# Patient Record
Sex: MALE | URBAN - METROPOLITAN AREA
[De-identification: names, ages, dates, MRNs, and addresses within clinical notes are randomized per-mention and may not be internally consistent; named-entity substitution may affect disease eponyms.]

---

## 2021-07-12 ENCOUNTER — OFFICE VISIT (OUTPATIENT)
Dept: URBAN - METROPOLITAN AREA CLINIC 48 | Facility: CLINIC | Age: 61
End: 2021-07-12
Payer: COMMERCIAL

## 2021-07-12 DIAGNOSIS — H25.813 COMBINED FORMS OF AGE-RELATED CATARACT, BILATERAL: Primary | ICD-10-CM

## 2021-07-12 PROCEDURE — 99204 OFFICE O/P NEW MOD 45 MIN: CPT | Performed by: OPTOMETRIST

## 2021-07-12 ASSESSMENT — INTRAOCULAR PRESSURE
OS: 12
OD: 12

## 2021-07-12 NOTE — IMPRESSION/PLAN
Impression: Combined forms of age-related cataract, bilateral: H25.813. Plan:  Cataracts appear to be dense enough to account for patient's vision. However, with decreased view into the fundus cannot determine if there is any retinal pathology with a hazy view. B scan: retina appears flat 360 OU Recommend Cataract Evaluation with Dr. Katina Avendano

## 2021-07-19 ENCOUNTER — OFFICE VISIT (OUTPATIENT)
Dept: URBAN - METROPOLITAN AREA CLINIC 48 | Facility: CLINIC | Age: 61
End: 2021-07-19
Payer: COMMERCIAL

## 2021-07-19 DIAGNOSIS — H25.811 COMBINED FORMS OF AGE-RELATED CATARACT, RIGHT EYE: Primary | ICD-10-CM

## 2021-07-19 PROCEDURE — 92134 CPTRZ OPH DX IMG PST SGM RTA: CPT | Performed by: STUDENT IN AN ORGANIZED HEALTH CARE EDUCATION/TRAINING PROGRAM

## 2021-07-19 PROCEDURE — 92004 COMPRE OPH EXAM NEW PT 1/>: CPT | Performed by: STUDENT IN AN ORGANIZED HEALTH CARE EDUCATION/TRAINING PROGRAM

## 2021-07-19 ASSESSMENT — KERATOMETRY
OS: 42.88
OD: 42.63

## 2021-07-19 ASSESSMENT — INTRAOCULAR PRESSURE
OD: 8
OS: 9

## 2021-07-19 NOTE — IMPRESSION/PLAN
Impression: Combined forms of age-related cataract, left eye: H25.812. Plan: Visually significant, poor view though OCT shows IRF OU can schedule OD will also schedule with Dr. Lillian Collazo for [facility]  further evaluation and treatment prior to proceeding with scheduling CE IOL OS.

## 2021-07-19 NOTE — IMPRESSION/PLAN
Impression: Combined forms of age-related cataract, right eye: H25.811. Plan: The patient has a visually significant cataract in the right eye. After discussion with the patient and careful examination it has been determined that a cataract in the right eye is accounting for a significant amount of patient's visual symptoms. Cataract surgery and the associated risks, benefits, alternatives, expectations, and recovery were discussed in detail with the patient. All questions were answered. The patient understands that there may be some limitation in visual potential given any pre-existing ocular disease. Advised patient we will be using: Dexycu Schedule Cataract Surgery right then. RL 2
good dilation, no previous LASIK surgery, no alpha blockers Discussed lens options with patient, patient candidate for standard Lens, pt. knows retinal abnormalities may limit vision after sx.

## 2021-07-20 ENCOUNTER — TESTING ONLY (OUTPATIENT)
Dept: URBAN - METROPOLITAN AREA CLINIC 48 | Facility: CLINIC | Age: 61
End: 2021-07-20
Payer: COMMERCIAL

## 2021-07-20 PROCEDURE — 76519 ECHO EXAM OF EYE: CPT | Performed by: STUDENT IN AN ORGANIZED HEALTH CARE EDUCATION/TRAINING PROGRAM

## 2021-07-20 ASSESSMENT — PACHYMETRY
OD: 22.73
OD: 2.62

## 2021-07-20 ASSESSMENT — KERATOMETRY
OS: 43.52
OD: 43.13

## 2021-07-27 ENCOUNTER — SURGERY (OUTPATIENT)
Dept: URBAN - METROPOLITAN AREA SURGERY 26 | Facility: SURGERY | Age: 61
End: 2021-07-27
Payer: COMMERCIAL

## 2021-07-27 PROCEDURE — 66984 XCAPSL CTRC RMVL W/O ECP: CPT | Performed by: STUDENT IN AN ORGANIZED HEALTH CARE EDUCATION/TRAINING PROGRAM

## 2021-07-28 ENCOUNTER — POST-OPERATIVE VISIT (OUTPATIENT)
Dept: URBAN - METROPOLITAN AREA CLINIC 48 | Facility: CLINIC | Age: 61
End: 2021-07-28
Payer: COMMERCIAL

## 2021-07-28 PROCEDURE — 99024 POSTOP FOLLOW-UP VISIT: CPT | Performed by: OPTOMETRIST

## 2021-07-28 RX ORDER — SODIUM CHLORIDE 50 MG/ML
5 % SOLUTION OPHTHALMIC
Qty: 10 | Refills: 2 | Status: ACTIVE
Start: 2021-07-28

## 2021-07-28 ASSESSMENT — INTRAOCULAR PRESSURE: OD: 15

## 2021-07-28 NOTE — IMPRESSION/PLAN
Impression: S/P Cataract Extraction by phacoemulsification with IOL placement OD - 1 Day. Encounter for surgical aftercare following surgery on a sense organ  Z48.810. Plan: Dexycu given during Surgery. Start: Adalgisa 128 QID OD Restrictions discussed, No Bending over, no lifting 20 pounds or more, no tap water in eye, wear eye shield for 1 week while sleeping. RD precautions. Call the office should any flashes, floaters, or vision changes occur. 

RTO 1 week PO2

## 2021-08-03 ENCOUNTER — POST-OPERATIVE VISIT (OUTPATIENT)
Dept: URBAN - METROPOLITAN AREA CLINIC 48 | Facility: CLINIC | Age: 61
End: 2021-08-03
Payer: COMMERCIAL

## 2021-08-03 DIAGNOSIS — Z48.810 ENCOUNTER FOR SURGICAL AFTERCARE FOLLOWING SURGERY ON A SENSE ORGAN: Primary | ICD-10-CM

## 2021-08-03 PROCEDURE — 99024 POSTOP FOLLOW-UP VISIT: CPT | Performed by: STUDENT IN AN ORGANIZED HEALTH CARE EDUCATION/TRAINING PROGRAM

## 2021-08-03 ASSESSMENT — INTRAOCULAR PRESSURE
OD: 13
OS: 12

## 2021-08-03 NOTE — IMPRESSION/PLAN
Impression: S/P Cataract Extraction by phacoemulsification with IOL placement OD - 7 Days. Encounter for surgical aftercare following surgery on a sense organ  Z48.810. Plan: - Eye is doing well
- d/c Adalgisa 128 
- RT/RD precautions reviewed with the patient
- Can resume normal activities with the exception of avoiding swimming for 1 more week Keep next appt.  as scheduled with Dr. Amarilys Chavez

## 2021-08-10 ENCOUNTER — POST-OPERATIVE VISIT (OUTPATIENT)
Dept: URBAN - METROPOLITAN AREA CLINIC 48 | Facility: CLINIC | Age: 61
End: 2021-08-10
Payer: COMMERCIAL

## 2021-08-10 PROCEDURE — 99024 POSTOP FOLLOW-UP VISIT: CPT | Performed by: OPHTHALMOLOGY

## 2021-08-10 ASSESSMENT — INTRAOCULAR PRESSURE
OD: 12
OS: 11

## 2021-08-10 NOTE — IMPRESSION/PLAN
Impression: S/P Cataract Extraction by phacoemulsification with IOL placement OD - 14 Days. Encounter for surgical aftercare following surgery on a sense organ  Z48.810. Plan: OD seems to be doing well RTC 2wk CAT/OCT Mac OS only - to be done as inez not PO

## 2021-09-01 ENCOUNTER — OFFICE VISIT (OUTPATIENT)
Dept: URBAN - METROPOLITAN AREA CLINIC 48 | Facility: CLINIC | Age: 61
End: 2021-09-01
Payer: COMMERCIAL

## 2021-09-01 DIAGNOSIS — H35.81 RETINAL EDEMA: ICD-10-CM

## 2021-09-01 DIAGNOSIS — H34.8310 BRANCH RETINAL VEIN OCCLUSION W/ MACULAR EDEMA, RIGHT EYE: Primary | ICD-10-CM

## 2021-09-01 DIAGNOSIS — H25.812 COMBINED FORMS OF AGE-RELATED CATARACT, LEFT EYE: ICD-10-CM

## 2021-09-01 PROCEDURE — 76512 OPH US DX B-SCAN: CPT | Performed by: OPHTHALMOLOGY

## 2021-09-01 PROCEDURE — 92014 COMPRE OPH EXAM EST PT 1/>: CPT | Performed by: OPHTHALMOLOGY

## 2021-09-01 PROCEDURE — 92134 CPTRZ OPH DX IMG PST SGM RTA: CPT | Performed by: OPHTHALMOLOGY

## 2021-09-01 ASSESSMENT — INTRAOCULAR PRESSURE
OS: 11
OD: 11

## 2021-09-01 NOTE — IMPRESSION/PLAN
Impression: Branch retinal vein occlusion w/ macular edema, right eye: K72.2755. OLD Plan: Old BRVO OD w/ ME, now that CE/IOL has been done there is better view of fundus. Recommend Avastin x1 OD To be done: Tomorrow 09/02 PLEASE GET AUTH
RTC 3wk DE/OCT

## 2021-09-01 NOTE — IMPRESSION/PLAN
Impression: Combined forms of age-related cataract, left eye: H25.812. Plan: The patient has a visually significant cataract in the left eye. After discussion with the patient and careful examination it has been determined that a cataract in the left eye is accounting for a significant amount of the patient's visual symptoms. Cataract surgery and the associated risks, benefits, alternatives, expectations, and recovery were discussed in detail with the patient. All questions were answered. The patient understands that there may be some limitation in visual potential given any pre-existing ocular disease. Discussed option of eye drops with patient, patient elects Dexycu injection   , discussed possible side effects of drops. The patient desires cataract surgery in the left eye. The patient desires cataract surgery in the left eye. Will need preop Atropine BID OS to be done 5 days prior to surgery. Schedule cataract surgery in the left eye. RL 2 Surgeon: Dr. Larry Lyons

## 2021-09-01 NOTE — IMPRESSION/PLAN
Impression: Retinal edema: H35.81 Left. Plan: Macular Edema noted on OCT Mac
recommend Avastin inj x1 To be done: Tomorrow 09/02 PLEASE GET Taryn Yang
will also do Bscan to check for poss RD. 
if RD is normal, proceed with CE/IOL in OS

## 2021-09-02 ENCOUNTER — PROCEDURE (OUTPATIENT)
Dept: URBAN - METROPOLITAN AREA CLINIC 48 | Facility: CLINIC | Age: 61
End: 2021-09-02
Payer: COMMERCIAL

## 2021-09-22 ENCOUNTER — OFFICE VISIT (OUTPATIENT)
Dept: URBAN - METROPOLITAN AREA CLINIC 48 | Facility: CLINIC | Age: 61
End: 2021-09-22
Payer: COMMERCIAL

## 2021-09-22 DIAGNOSIS — E11.3312 TYPE 2 DIAB W MODERATE NONPRLF DIAB RTNOP W MACULAR EDEMA, LEFT EYE: ICD-10-CM

## 2021-09-22 PROCEDURE — 92134 CPTRZ OPH DX IMG PST SGM RTA: CPT | Performed by: OPHTHALMOLOGY

## 2021-09-22 PROCEDURE — 92014 COMPRE OPH EXAM EST PT 1/>: CPT | Performed by: OPHTHALMOLOGY

## 2021-09-22 ASSESSMENT — INTRAOCULAR PRESSURE
OD: 11
OS: 9

## 2021-09-22 NOTE — IMPRESSION/PLAN
Impression: Type 2 diab w moderate nonprlf diab rtnop w macular edema, left eye: M66.9961. OCT MAC Findings: 
increased ME OD
decrease ME OS Plan: Recommend Avastin inj. 09/30/21 with 2 wk. post inj.  IOP check

see plan 1

## 2021-09-22 NOTE — IMPRESSION/PLAN
Impression: Branch retinal vein occlusion w/ macular edema, right eye: O98.3039. OCT MAC Findings: 
increased ME OD
decrease ME OS Plan: Recommend 1/2 dose Triecense OD on 09/30/21. (please obtain auth. if necessary) 
-R/B/A reviewed. Start Llevro qd od until gone (sample provided for pt. today) w/2 wk. post injection IOP check, YAG CAP OD, CAT EVAL OS (long exam)

## 2021-10-05 ENCOUNTER — SURGERY (OUTPATIENT)
Dept: URBAN - METROPOLITAN AREA SURGERY 26 | Facility: SURGERY | Age: 61
End: 2021-10-05
Payer: COMMERCIAL

## 2021-10-05 PROCEDURE — 66982 XCAPSL CTRC RMVL CPLX WO ECP: CPT | Performed by: STUDENT IN AN ORGANIZED HEALTH CARE EDUCATION/TRAINING PROGRAM

## 2021-10-06 ENCOUNTER — POST-OPERATIVE VISIT (OUTPATIENT)
Dept: URBAN - METROPOLITAN AREA CLINIC 48 | Facility: CLINIC | Age: 61
End: 2021-10-06
Payer: COMMERCIAL

## 2021-10-06 DIAGNOSIS — Z96.1 PRESENCE OF INTRAOCULAR LENS: Primary | ICD-10-CM

## 2021-10-06 PROCEDURE — 99024 POSTOP FOLLOW-UP VISIT: CPT | Performed by: OPTOMETRIST

## 2021-10-06 ASSESSMENT — INTRAOCULAR PRESSURE: OS: 16

## 2021-10-06 NOTE — IMPRESSION/PLAN
Impression: S/P Cataract Extraction by phacoemulsification with IOL placement OS - 1 Day. Presence of intraocular lens  Z96.1. Excellent post op course Plan: Dexycu given during Surgery. No post operative drops are needed. Restrictions discussed, No Bending over, no lifting 20 pounds or more, no tap water in eye, wear eye shield for 1 week while sleeping. RD precautions. Call the office should any flashes, floaters, or vision changes occur. 

RTO 1 week PO2

## 2021-10-12 ENCOUNTER — POST-OPERATIVE VISIT (OUTPATIENT)
Dept: URBAN - METROPOLITAN AREA CLINIC 48 | Facility: CLINIC | Age: 61
End: 2021-10-12
Payer: COMMERCIAL

## 2021-10-12 PROCEDURE — 99024 POSTOP FOLLOW-UP VISIT: CPT | Performed by: STUDENT IN AN ORGANIZED HEALTH CARE EDUCATION/TRAINING PROGRAM

## 2021-10-12 ASSESSMENT — INTRAOCULAR PRESSURE
OS: 11
OD: 11

## 2021-10-12 NOTE — IMPRESSION/PLAN
Impression: S/P Cataract Extraction by phacoemulsification with IOL placement OS - 7 Days. Presence of intraocular lens  Z96.1. OCT MAC Findings: OD SRF, OS Retinal atrophy Plan: - Eye is doing well - At this time pt. may obtain OTC Reading glasses - Recommend pt. to hold off on obtaining new prescription glasses until SRF has improved OD.
- RT/RD precautions reviewed with the patient
- Can resume normal activities with the exception of avoiding swimming for 1 more week RTC 1-2 wks.  with Dr. Rufus Carrillo for [facility]  with MAC OCT (long exam)

## 2021-10-26 ENCOUNTER — OFFICE VISIT (OUTPATIENT)
Dept: URBAN - METROPOLITAN AREA CLINIC 48 | Facility: CLINIC | Age: 61
End: 2021-10-26
Payer: COMMERCIAL

## 2021-10-26 DIAGNOSIS — H26.491 OTHER SECONDARY CATARACT, RIGHT EYE: ICD-10-CM

## 2021-10-26 PROCEDURE — 92014 COMPRE OPH EXAM EST PT 1/>: CPT | Performed by: OPHTHALMOLOGY

## 2021-10-26 ASSESSMENT — INTRAOCULAR PRESSURE
OD: 14
OS: 14

## 2021-10-26 NOTE — IMPRESSION/PLAN
Impression: Branch retinal vein occlusion w/ macular edema, right eye: V83.9136. OCT MAC Findings: 
increased ME OD
decrease ME OS Plan: Recommend Avastin OD x 2, discussed with patient Schedule Avastin OD X 2 every 4 weeks then  3 weeks DE/OCT Radha Carlin #1 0-4 days , please get auth

## 2021-10-26 NOTE — IMPRESSION/PLAN
Impression: Other secondary cataract, right eye: H26.491. Plan: Risks, benefits, alternatives, and expectations of YAG capsulotomy were discussed. The patient desires a YAG capsulotomy in the right eye.   Schedule YAG capsulotomy in the right eye, RL 2
1 day PO1 then 1 week PO 2

## 2021-10-28 ENCOUNTER — PROCEDURE (OUTPATIENT)
Dept: URBAN - METROPOLITAN AREA CLINIC 48 | Facility: CLINIC | Age: 61
End: 2021-10-28
Payer: COMMERCIAL

## 2021-10-28 PROCEDURE — 67028 INJECTION EYE DRUG: CPT | Performed by: OPHTHALMOLOGY

## 2021-11-04 ENCOUNTER — SURGERY (OUTPATIENT)
Dept: URBAN - METROPOLITAN AREA SURGERY 26 | Facility: SURGERY | Age: 61
End: 2021-11-04
Payer: COMMERCIAL

## 2021-11-04 PROCEDURE — 66821 AFTER CATARACT LASER SURGERY: CPT | Performed by: OPHTHALMOLOGY

## 2021-11-30 ENCOUNTER — PROCEDURE (OUTPATIENT)
Dept: URBAN - METROPOLITAN AREA CLINIC 48 | Facility: CLINIC | Age: 61
End: 2021-11-30
Payer: COMMERCIAL

## 2021-11-30 PROCEDURE — 67028 INJECTION EYE DRUG: CPT | Performed by: OPHTHALMOLOGY

## 2022-01-04 ENCOUNTER — OFFICE VISIT (OUTPATIENT)
Dept: URBAN - METROPOLITAN AREA CLINIC 48 | Facility: CLINIC | Age: 62
End: 2022-01-04
Payer: COMMERCIAL

## 2022-01-04 PROCEDURE — 92134 CPTRZ OPH DX IMG PST SGM RTA: CPT | Performed by: OPHTHALMOLOGY

## 2022-01-04 PROCEDURE — 92014 COMPRE OPH EXAM EST PT 1/>: CPT | Performed by: OPHTHALMOLOGY

## 2022-01-04 ASSESSMENT — INTRAOCULAR PRESSURE
OD: 12
OS: 11

## 2022-01-04 NOTE — IMPRESSION/PLAN
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 May require vitrectomy in the future for AH . Plan: Patient has poor glucose control. Recommend Injections x 3 OU. Patient to continue good glucose control and blood pressure control. Schedule Avastin OU x 3  #1 0-3 days , then every 4 weeks on Injection Friday's, please get Classie Closs 
at #2 injection please get OCT mac with progression analysis

## 2022-01-14 ENCOUNTER — PROCEDURE (OUTPATIENT)
Dept: URBAN - METROPOLITAN AREA CLINIC 48 | Facility: CLINIC | Age: 62
End: 2022-01-14
Payer: COMMERCIAL

## 2022-02-11 ENCOUNTER — PROCEDURE (OUTPATIENT)
Dept: URBAN - METROPOLITAN AREA CLINIC 48 | Facility: CLINIC | Age: 62
End: 2022-02-11
Payer: COMMERCIAL

## 2022-03-11 ENCOUNTER — PROCEDURE (OUTPATIENT)
Dept: URBAN - METROPOLITAN AREA CLINIC 48 | Facility: CLINIC | Age: 62
End: 2022-03-11
Payer: COMMERCIAL

## 2022-03-11 DIAGNOSIS — E11.3313 TYPE 2 DIABETES MELLITUS WITH MODERATE NONPROLIFERATIVE DIABETIC RETINOPATHY WITH MACULAR EDEMA, BILATERAL: Primary | ICD-10-CM

## 2022-04-13 ENCOUNTER — OFFICE VISIT (OUTPATIENT)
Dept: URBAN - METROPOLITAN AREA CLINIC 48 | Facility: CLINIC | Age: 62
End: 2022-04-13
Payer: COMMERCIAL

## 2022-04-13 DIAGNOSIS — E11.3313 TYPE 2 DIAB W MODERATE NONPRLF DIAB RTNOP W MACULAR EDEMA, BILATERAL: Primary | ICD-10-CM

## 2022-04-13 PROCEDURE — 92014 COMPRE OPH EXAM EST PT 1/>: CPT | Performed by: OPHTHALMOLOGY

## 2022-04-13 PROCEDURE — 92134 CPTRZ OPH DX IMG PST SGM RTA: CPT | Performed by: OPHTHALMOLOGY

## 2022-04-13 NOTE — IMPRESSION/PLAN
Impression: Type 2 diab w moderate nonprlf diab rtnop w macular edema, bilateral: R76.3675. OCT MAC findings: 
decrease ME OD 
decrease ME OS Plan: Patient has poor glucose control. Recommend Injections x 3 OU. Patient to continue good glucose control and blood pressure control. Schedule Avastin OD  x 2 
 #1 0- 10 days , then every 4 weeks on Injection Friday's, please get AUTH if required
at #2  injection. RTC 3-4 wk. post last inj. 
(please get OCT mac with progression analysis) RTRC 3-4 wks. post last injection. LTM (Longer term plan): After right eye ME better controlled will consider refraction and MARIELY on left eye. Patient may have macula ischemia. Could possibly benefit from IVFA and maybe vitrecomy of floaters OS with retina surgeon. Will consider in future exams.

## 2022-04-22 ENCOUNTER — PROCEDURE (OUTPATIENT)
Dept: URBAN - METROPOLITAN AREA CLINIC 48 | Facility: CLINIC | Age: 62
End: 2022-04-22
Payer: COMMERCIAL

## 2022-04-22 DIAGNOSIS — E11.3313 TYPE 2 DIABETES MELLITUS WITH MODERATE NONPROLIFERATIVE DIABETIC RETINOPATHY WITH MACULAR EDEMA, BILATERAL: Primary | ICD-10-CM

## 2022-04-22 PROCEDURE — 67028 INJECTION EYE DRUG: CPT | Performed by: OPHTHALMOLOGY

## 2022-05-20 ENCOUNTER — PROCEDURE (OUTPATIENT)
Dept: URBAN - METROPOLITAN AREA CLINIC 48 | Facility: CLINIC | Age: 62
End: 2022-05-20
Payer: COMMERCIAL

## 2022-05-20 DIAGNOSIS — E11.3313 TYPE 2 DIABETES MELLITUS WITH MODERATE NONPROLIFERATIVE DIABETIC RETINOPATHY WITH MACULAR EDEMA, BILATERAL: Primary | ICD-10-CM

## 2022-05-20 PROCEDURE — 67028 INJECTION EYE DRUG: CPT | Performed by: OPHTHALMOLOGY

## 2022-09-27 ENCOUNTER — OFFICE VISIT (OUTPATIENT)
Dept: URBAN - METROPOLITAN AREA CLINIC 48 | Facility: CLINIC | Age: 62
End: 2022-09-27
Payer: COMMERCIAL

## 2022-09-27 DIAGNOSIS — E11.3313 TYPE 2 DIABETES MELLITUS WITH MODERATE NONPROLIFERATIVE DIABETIC RETINOPATHY WITH MACULAR EDEMA, BILATERAL: Primary | ICD-10-CM

## 2022-09-27 DIAGNOSIS — H26.492 OTHER SECONDARY CATARACT, LEFT EYE: ICD-10-CM

## 2022-09-27 PROCEDURE — 92134 CPTRZ OPH DX IMG PST SGM RTA: CPT | Performed by: OPHTHALMOLOGY

## 2022-09-27 PROCEDURE — 92014 COMPRE OPH EXAM EST PT 1/>: CPT | Performed by: OPHTHALMOLOGY

## 2022-09-27 NOTE — IMPRESSION/PLAN
Impression: Other secondary cataract, left eye: H26.492. Plan: Risks, benefits, alternatives, and expectations of YAG capsulotomy were discussed. The patient desires a YAG capsulotomy in the left eye.  


Schedule YAG capsulotomy in the left eye RL 2
 with same or next day IOP check then 1 week PO. RL 2

## 2022-09-27 NOTE — IMPRESSION/PLAN
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 Plan: ME far from fovea OD, stable OS. Will take a break from injections at this time. if ME continues, may need to do additional injections and/or laser Continue to practice good sugar control. 

RTC 6wk DE/OCT Mac

## 2022-10-20 ENCOUNTER — POST-OPERATIVE VISIT (OUTPATIENT)
Dept: URBAN - METROPOLITAN AREA CLINIC 48 | Facility: CLINIC | Age: 62
End: 2022-10-20
Payer: COMMERCIAL

## 2022-10-20 DIAGNOSIS — Z48.810 ENCOUNTER FOR SURGICAL AFTERCARE FOLLOWING SURGERY ON A SENSE ORGAN: Primary | ICD-10-CM

## 2022-10-20 PROCEDURE — 92134 CPTRZ OPH DX IMG PST SGM RTA: CPT | Performed by: OPHTHALMOLOGY

## 2022-10-20 PROCEDURE — 99024 POSTOP FOLLOW-UP VISIT: CPT | Performed by: OPHTHALMOLOGY

## 2022-10-20 ASSESSMENT — INTRAOCULAR PRESSURE
OS: 14
OD: 15

## 2022-10-20 NOTE — IMPRESSION/PLAN
Impression: S/P YAG Capsulotomy (Yttrium Aluminum Idalou) OS - 7 Days. Encounter for surgical aftercare following surgery on a sense organ  Z48.810.  Plan: VA improved since YAG

## 2022-10-20 NOTE — IMPRESSION/PLAN
Impression: Type 2 diab w moderate nonprlf diab rtnop w macular edema, bilateral: O67.3402. Plan: Increased ME OU. Recommend Avastin injection OU Please schedule: Avastin OU x1 To be done: Tomorrow Friday PLEASE GET AUTH
w/ 3wk DE/OCT Mac

## 2022-10-21 ENCOUNTER — PROCEDURE (OUTPATIENT)
Dept: URBAN - METROPOLITAN AREA CLINIC 48 | Facility: CLINIC | Age: 62
End: 2022-10-21
Payer: COMMERCIAL

## 2022-10-21 DIAGNOSIS — E11.3313 TYPE 2 DIABETES MELLITUS WITH MODERATE NONPROLIFERATIVE DIABETIC RETINOPATHY WITH MACULAR EDEMA, BILATERAL: Primary | ICD-10-CM

## 2022-11-15 ENCOUNTER — OFFICE VISIT (OUTPATIENT)
Dept: URBAN - METROPOLITAN AREA CLINIC 48 | Facility: CLINIC | Age: 62
End: 2022-11-15
Payer: COMMERCIAL

## 2022-11-15 DIAGNOSIS — E11.3313 TYPE 2 DIABETES MELLITUS WITH MODERATE NONPROLIFERATIVE DIABETIC RETINOPATHY WITH MACULAR EDEMA, BILATERAL: Primary | ICD-10-CM

## 2022-11-15 PROCEDURE — 92014 COMPRE OPH EXAM EST PT 1/>: CPT | Performed by: OPHTHALMOLOGY

## 2022-11-15 PROCEDURE — 92134 CPTRZ OPH DX IMG PST SGM RTA: CPT | Performed by: OPHTHALMOLOGY

## 2022-11-15 ASSESSMENT — INTRAOCULAR PRESSURE
OD: 14
OS: 14

## 2022-11-15 NOTE — IMPRESSION/PLAN
Impression: Type 2 diabetes mellitus with moderate nonproliferative diabetic retinopathy with macular edema, bilateral: I71.9166. Plan: Recommend 1 more injection to OD as there is good response. Has spot in OD where we can do laser to help reduce injections in long run. MAP OD - circunate exudate around a MA. R/B/A Discussed with patient. Please schedule: MAP OD RL 2 
w/o Block 
in laser suite, please have MAC from 11/15/22 available for review RTC 10wk DE/OCT Mac

## 2022-12-27 ENCOUNTER — SURGERY (OUTPATIENT)
Dept: URBAN - METROPOLITAN AREA SURGERY 26 | Facility: SURGERY | Age: 62
End: 2022-12-27
Payer: COMMERCIAL

## 2022-12-27 PROCEDURE — 67210 TREATMENT OF RETINAL LESION: CPT | Performed by: OPHTHALMOLOGY

## 2023-01-24 ENCOUNTER — POST-OPERATIVE VISIT (OUTPATIENT)
Dept: URBAN - METROPOLITAN AREA CLINIC 48 | Facility: CLINIC | Age: 63
End: 2023-01-24
Payer: COMMERCIAL

## 2023-01-24 DIAGNOSIS — E11.3511 TYPE 2 DIABETES MELLITUS W/ PROLIFERATIVE DIABETIC RETINOPATHY W/ MACULAR EDEMA, RIGHT EYE: Primary | ICD-10-CM

## 2023-01-24 DIAGNOSIS — E11.3592 TYPE 2 DIABETES MELLITUS W/ PROLIFERATIVE DIABETIC RETINOPATHY W/O MACULAR EDEMA, LEFT EYE: ICD-10-CM

## 2023-01-24 DIAGNOSIS — H43.392 OTHER VITREOUS OPACITIES, LEFT EYE: ICD-10-CM

## 2023-01-24 PROCEDURE — 99024 POSTOP FOLLOW-UP VISIT: CPT | Performed by: OPHTHALMOLOGY

## 2023-01-24 ASSESSMENT — INTRAOCULAR PRESSURE
OS: 13
OD: 12

## 2023-01-24 NOTE — IMPRESSION/PLAN
Impression: Type 2 diabetes mellitus w/ proliferative diabetic retinopathy w/o macular edema, left eye: Z80.5232. Plan: non urgent consultation with Dr. Gonzales Estimable  in 2 months  
then general consultation with Dr. Kody Saenz

## 2023-01-24 NOTE — IMPRESSION/PLAN
Impression: Type 2 diabetes mellitus w/ proliferative diabetic retinopathy w/ macular edema, right eye: Y55.2068. Plan: Stable, no need for injections at this time. Will continue to monitor closely.

## 2023-01-24 NOTE — IMPRESSION/PLAN
Impression: Other vitreous opacities, left eye: H43.392. Plan: AH, do not suspect for OS vision 
would benefit from vitrectomy Dr. Jake Noriega next visit.

## 2023-05-03 ENCOUNTER — OFFICE VISIT (OUTPATIENT)
Facility: LOCATION | Age: 63
End: 2023-05-03
Payer: COMMERCIAL

## 2023-05-03 DIAGNOSIS — Z96.1 PRESENCE OF INTRAOCULAR LENS: ICD-10-CM

## 2023-05-03 DIAGNOSIS — E11.3392 TYPE 2 DIAB WITH MOD NONP RTNOP WITHOUT MACULAR EDEMA, L EYE: ICD-10-CM

## 2023-05-03 DIAGNOSIS — H43.392 OTHER VITREOUS OPACITIES, LEFT EYE: ICD-10-CM

## 2023-05-03 DIAGNOSIS — E11.3511 TYPE 2 DIABETES MELLITUS WITH PROLIFERATIVE DIABETIC RETINOPATHY WITH MACULAR EDEMA, RIGHT EYE: ICD-10-CM

## 2023-05-03 DIAGNOSIS — E11.3311 TYPE 2 DIAB WITH MOD NONP RTNOP WITH MACULAR EDEMA, R EYE: Primary | ICD-10-CM

## 2023-05-03 PROCEDURE — 92134 CPTRZ OPH DX IMG PST SGM RTA: CPT | Performed by: OPHTHALMOLOGY

## 2023-05-03 PROCEDURE — 92004 COMPRE OPH EXAM NEW PT 1/>: CPT | Performed by: OPHTHALMOLOGY

## 2023-05-03 ASSESSMENT — INTRAOCULAR PRESSURE
OS: 14
OD: 13

## 2023-05-03 NOTE — IMPRESSION/PLAN
Impression: Other vitreous opacities, left eye: H43.392. 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 The benign nature of this condition was discussed with the patient as this does not typically affect vision. We will continue to monitor for now.

## 2023-05-03 NOTE — IMPRESSION/PLAN
Impression: Type 2 diab with mod nonp rtnop with macular edema, r eye: B05.1654.
-history of focal laser OCT: 5/3/23 OD: temporal extrafoveal DME (increased from 6 months ago) OS: macular atrophy Plan: The diagnosis and prognosis of diabetic macular edema, as well as the risks and benefits of various treatment options including focal/grid laser, steroids, Lucentis, Eylea and Avastin; along with the alternatives of observation or participation in a clinical trial, were discussed at length. The patient understands that treatment may not improve vision, but should reduce the risk of further visual loss. Given stability of vision and exam, pt elects to proceed with observation.  

RTC 3 months DFE/OCT OU re-eeval

## 2023-08-07 ENCOUNTER — OFFICE VISIT (OUTPATIENT)
Facility: LOCATION | Age: 63
End: 2023-08-07
Payer: COMMERCIAL

## 2023-08-07 DIAGNOSIS — E11.3392 TYPE 2 DIAB WITH MOD NONP RTNOP WITHOUT MACULAR EDEMA, L EYE: ICD-10-CM

## 2023-08-07 DIAGNOSIS — E11.3311 TYPE 2 DIAB WITH MOD NONP RTNOP WITH MACULAR EDEMA, R EYE: Primary | ICD-10-CM

## 2023-08-07 DIAGNOSIS — H43.392 OTHER VITREOUS OPACITIES, LEFT EYE: ICD-10-CM

## 2023-08-07 PROCEDURE — 92014 COMPRE OPH EXAM EST PT 1/>: CPT | Performed by: OPHTHALMOLOGY

## 2023-08-07 PROCEDURE — 67028 INJECTION EYE DRUG: CPT | Performed by: OPHTHALMOLOGY

## 2023-08-07 PROCEDURE — 92134 CPTRZ OPH DX IMG PST SGM RTA: CPT | Performed by: OPHTHALMOLOGY

## 2023-08-07 ASSESSMENT — INTRAOCULAR PRESSURE
OD: 7
OS: 10

## 2023-09-20 ENCOUNTER — OFFICE VISIT (OUTPATIENT)
Facility: LOCATION | Age: 63
End: 2023-09-20
Payer: COMMERCIAL

## 2023-09-20 DIAGNOSIS — E11.3311 TYPE 2 DIABETES MELLITUS WITH MODERATE NONPROLIFERATIVE DIABETIC RETINOPATHY WITH MACULAR EDEMA, RIGHT EYE: Primary | ICD-10-CM

## 2023-09-20 PROCEDURE — 92134 CPTRZ OPH DX IMG PST SGM RTA: CPT | Performed by: OPHTHALMOLOGY

## 2023-09-20 PROCEDURE — 67028 INJECTION EYE DRUG: CPT | Performed by: OPHTHALMOLOGY

## 2023-09-20 ASSESSMENT — INTRAOCULAR PRESSURE
OS: 8
OD: 11

## 2023-10-18 ENCOUNTER — OFFICE VISIT (OUTPATIENT)
Facility: LOCATION | Age: 63
End: 2023-10-18
Payer: COMMERCIAL

## 2023-10-18 DIAGNOSIS — H43.392 OTHER VITREOUS OPACITIES, LEFT EYE: ICD-10-CM

## 2023-10-18 DIAGNOSIS — E11.3392 TYPE 2 DIAB WITH MOD NONP RTNOP WITHOUT MACULAR EDEMA, L EYE: ICD-10-CM

## 2023-10-18 DIAGNOSIS — E11.3311 TYPE 2 DIABETES MELLITUS WITH MODERATE NONPROLIFERATIVE DIABETIC RETINOPATHY WITH MACULAR EDEMA, RIGHT EYE: Primary | ICD-10-CM

## 2023-10-18 PROCEDURE — 92134 CPTRZ OPH DX IMG PST SGM RTA: CPT | Performed by: OPHTHALMOLOGY

## 2023-10-18 PROCEDURE — 92014 COMPRE OPH EXAM EST PT 1/>: CPT | Performed by: OPHTHALMOLOGY

## 2023-10-18 ASSESSMENT — INTRAOCULAR PRESSURE
OS: 7
OD: 8

## 2023-10-30 ENCOUNTER — OFFICE VISIT (OUTPATIENT)
Facility: LOCATION | Age: 63
End: 2023-10-30
Payer: COMMERCIAL

## 2023-10-30 DIAGNOSIS — E11.3311 TYPE 2 DIABETES MELLITUS WITH MODERATE NONPROLIFERATIVE DIABETIC RETINOPATHY WITH MACULAR EDEMA, RIGHT EYE: Primary | ICD-10-CM

## 2023-10-30 PROCEDURE — 67028 INJECTION EYE DRUG: CPT | Performed by: OPHTHALMOLOGY

## 2023-10-30 PROCEDURE — 92134 CPTRZ OPH DX IMG PST SGM RTA: CPT | Performed by: OPHTHALMOLOGY

## 2023-10-30 ASSESSMENT — INTRAOCULAR PRESSURE
OD: 12
OS: 15

## 2023-11-27 PROCEDURE — 92134 CPTRZ OPH DX IMG PST SGM RTA: CPT | Performed by: OPHTHALMOLOGY

## 2023-11-27 PROCEDURE — 67028 INJECTION EYE DRUG: CPT | Performed by: OPHTHALMOLOGY

## 2023-11-27 ASSESSMENT — INTRAOCULAR PRESSURE
OS: 10
OD: 13

## 2024-02-07 ENCOUNTER — OFFICE VISIT (OUTPATIENT)
Facility: LOCATION | Age: 64
End: 2024-02-07
Payer: COMMERCIAL

## 2024-02-07 DIAGNOSIS — H43.392 OTHER VITREOUS OPACITIES, LEFT EYE: ICD-10-CM

## 2024-02-07 DIAGNOSIS — E11.3392 TYPE 2 DIAB WITH MOD NONP RTNOP WITHOUT MACULAR EDEMA, L EYE: ICD-10-CM

## 2024-02-07 DIAGNOSIS — E11.3311 TYPE 2 DIABETES MELLITUS WITH MODERATE NONPROLIFERATIVE DIABETIC RETINOPATHY WITH MACULAR EDEMA, RIGHT EYE: Primary | ICD-10-CM

## 2024-02-07 PROCEDURE — 92014 COMPRE OPH EXAM EST PT 1/>: CPT | Performed by: OPHTHALMOLOGY

## 2024-02-07 PROCEDURE — 67028 INJECTION EYE DRUG: CPT | Performed by: OPHTHALMOLOGY

## 2024-02-07 PROCEDURE — 92134 CPTRZ OPH DX IMG PST SGM RTA: CPT | Performed by: OPHTHALMOLOGY

## 2024-02-07 ASSESSMENT — INTRAOCULAR PRESSURE
OS: 13
OD: 13